# Patient Record
Sex: MALE | Race: WHITE
[De-identification: names, ages, dates, MRNs, and addresses within clinical notes are randomized per-mention and may not be internally consistent; named-entity substitution may affect disease eponyms.]

---

## 2019-02-15 ENCOUNTER — HOSPITAL ENCOUNTER (EMERGENCY)
Dept: HOSPITAL 12 - ER | Age: 74
Discharge: HOME | End: 2019-02-15
Payer: MEDICARE

## 2019-02-15 VITALS — WEIGHT: 170 LBS | BODY MASS INDEX: 24.34 KG/M2 | HEIGHT: 70 IN

## 2019-02-15 DIAGNOSIS — J20.9: Primary | ICD-10-CM

## 2019-02-15 DIAGNOSIS — Z95.1: ICD-10-CM

## 2019-02-15 LAB
ALP SERPL-CCNC: 183 U/L (ref 50–136)
ALT SERPL W/O P-5'-P-CCNC: 402 U/L (ref 16–63)
AST SERPL-CCNC: 197 U/L (ref 15–37)
BASOPHILS # BLD AUTO: 0.1 K/UL (ref 0–8)
BASOPHILS NFR BLD AUTO: 0.8 % (ref 0–2)
BILIRUB DIRECT SERPL-MCNC: 1.2 MG/DL (ref 0–0.2)
BILIRUB SERPL-MCNC: 2.2 MG/DL (ref 0.2–1)
BUN SERPL-MCNC: 29 MG/DL (ref 7–18)
CHLORIDE SERPL-SCNC: 103 MMOL/L (ref 98–107)
CO2 SERPL-SCNC: 23 MMOL/L (ref 21–32)
CREAT SERPL-MCNC: 1.4 MG/DL (ref 0.6–1.3)
EOSINOPHIL # BLD AUTO: 0.4 K/UL (ref 0–0.7)
EOSINOPHIL NFR BLD AUTO: 3 % (ref 0–7)
GLUCOSE SERPL-MCNC: 115 MG/DL (ref 74–106)
HCT VFR BLD AUTO: 36.2 % (ref 36.7–47.1)
HGB BLD-MCNC: 12.1 G/DL (ref 12.5–16.3)
LYMPHOCYTES # BLD AUTO: 0.7 K/UL (ref 20–40)
LYMPHOCYTES NFR BLD AUTO: 6.2 % (ref 20.5–51.5)
MCH RBC QN AUTO: 31 UUG (ref 23.8–33.4)
MCHC RBC AUTO-ENTMCNC: 34 G/DL (ref 32.5–36.3)
MCV RBC AUTO: 92.3 FL (ref 73–96.2)
MONOCYTES # BLD AUTO: 1.1 K/UL (ref 2–10)
MONOCYTES NFR BLD AUTO: 9 % (ref 0–11)
NEUTROPHILS # BLD AUTO: 9.7 K/UL (ref 1.8–8.9)
NEUTROPHILS NFR BLD AUTO: 81 % (ref 38.5–71.5)
PLATELET # BLD AUTO: 345 K/UL (ref 152–348)
POTASSIUM SERPL-SCNC: 4.7 MMOL/L (ref 3.5–5.1)
RBC # BLD AUTO: 3.92 MIL/UL (ref 4.06–5.63)
WBC # BLD AUTO: 12 K/UL (ref 3.6–10.2)
WS STN SPEC: 7.5 G/DL (ref 6.4–8.2)

## 2019-02-15 PROCEDURE — A4663 DIALYSIS BLOOD PRESSURE CUFF: HCPCS

## 2019-02-15 NOTE — NUR
Copies of all the tests' results were given to patient per request from patient 
and family.  CD copy of the x-ray was ordered as well.  Patient discharged to 
home in stable conditon.  Written and verbal after care instructions given to 
patient and family. Patient and family verbalized understanding of 
instructions.

## 2022-11-25 ENCOUNTER — HOSPITAL ENCOUNTER (EMERGENCY)
Dept: HOSPITAL 12 - ER | Age: 77
Discharge: HOME | End: 2022-11-25
Payer: MEDICARE

## 2022-11-25 VITALS — WEIGHT: 165 LBS | HEIGHT: 70 IN | BODY MASS INDEX: 23.62 KG/M2
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
## 2022-11-25 DIAGNOSIS — Z95.1: ICD-10-CM

## 2022-11-25 DIAGNOSIS — S81.802A: Primary | ICD-10-CM

## 2022-11-25 DIAGNOSIS — R03.0: ICD-10-CM

## 2022-11-25 DIAGNOSIS — L03.116: ICD-10-CM

## 2022-11-25 PROCEDURE — A4663 DIALYSIS BLOOD PRESSURE CUFF: HCPCS
